# Patient Record
Sex: FEMALE | URBAN - METROPOLITAN AREA
[De-identification: names, ages, dates, MRNs, and addresses within clinical notes are randomized per-mention and may not be internally consistent; named-entity substitution may affect disease eponyms.]

---

## 2018-11-15 ENCOUNTER — OFFICE VISIT (OUTPATIENT)
Dept: URBAN - METROPOLITAN AREA CLINIC 88 | Facility: CLINIC | Age: 58
End: 2018-11-15
Payer: MEDICAID

## 2018-11-15 DIAGNOSIS — H43.393 OTHER VITREOUS OPACITIES, BILATERAL: ICD-10-CM

## 2018-11-15 DIAGNOSIS — H04.123 DRY EYE SYNDROME OF BILATERAL LACRIMAL GLANDS: Primary | ICD-10-CM

## 2018-11-15 DIAGNOSIS — H25.13 AGE-RELATED NUCLEAR CATARACT, BILATERAL: ICD-10-CM

## 2018-11-15 DIAGNOSIS — H52.4 PRESBYOPIA: ICD-10-CM

## 2018-11-15 PROCEDURE — 99203 OFFICE O/P NEW LOW 30 MIN: CPT | Performed by: OPTOMETRIST

## 2018-11-15 ASSESSMENT — KERATOMETRY
OD: 2284.63
OS: 44.63

## 2018-11-15 ASSESSMENT — INTRAOCULAR PRESSURE
OD: 12
OS: 12

## 2018-11-15 ASSESSMENT — VISUAL ACUITY
OS: 20/20
OD: 20/25

## 2018-11-15 NOTE — IMPRESSION/PLAN
Impression: Dry eye syndrome of bilateral lacrimal glands: H04.123. Plan: Discussed condition with patient in detail. Recommend treatment with Artificial tears QID (Systane or Refresh Brand recommended). RTC if symptoms continue.